# Patient Record
Sex: FEMALE | Race: BLACK OR AFRICAN AMERICAN | NOT HISPANIC OR LATINO | Employment: FULL TIME | URBAN - METROPOLITAN AREA
[De-identification: names, ages, dates, MRNs, and addresses within clinical notes are randomized per-mention and may not be internally consistent; named-entity substitution may affect disease eponyms.]

---

## 2021-06-25 ENCOUNTER — LAB (OUTPATIENT)
Dept: LAB | Facility: HOSPITAL | Age: 26
End: 2021-06-25

## 2021-06-25 ENCOUNTER — OFFICE VISIT (OUTPATIENT)
Dept: FAMILY MEDICINE CLINIC | Facility: CLINIC | Age: 26
End: 2021-06-25

## 2021-06-25 VITALS
DIASTOLIC BLOOD PRESSURE: 82 MMHG | HEART RATE: 73 BPM | HEIGHT: 62 IN | BODY MASS INDEX: 23.74 KG/M2 | WEIGHT: 129 LBS | OXYGEN SATURATION: 99 % | SYSTOLIC BLOOD PRESSURE: 118 MMHG

## 2021-06-25 DIAGNOSIS — E04.9 GOITER: ICD-10-CM

## 2021-06-25 DIAGNOSIS — R63.4 WEIGHT LOSS: ICD-10-CM

## 2021-06-25 DIAGNOSIS — Z30.09 FAMILY PLANNING: ICD-10-CM

## 2021-06-25 DIAGNOSIS — Z32.00 POSSIBLE PREGNANCY: Primary | ICD-10-CM

## 2021-06-25 LAB
B-HCG UR QL: NEGATIVE
BASOPHILS # BLD AUTO: 0.03 10*3/MM3 (ref 0–0.2)
BASOPHILS NFR BLD AUTO: 0.4 % (ref 0–1.5)
DEPRECATED RDW RBC AUTO: 40.5 FL (ref 37–54)
EOSINOPHIL # BLD AUTO: 0.05 10*3/MM3 (ref 0–0.4)
EOSINOPHIL NFR BLD AUTO: 0.7 % (ref 0.3–6.2)
ERYTHROCYTE [DISTWIDTH] IN BLOOD BY AUTOMATED COUNT: 12.1 % (ref 12.3–15.4)
HCT VFR BLD AUTO: 40.1 % (ref 34–46.6)
HCV AB SER DONR QL: NORMAL
HGB BLD-MCNC: 13.7 G/DL (ref 12–15.9)
IMM GRANULOCYTES # BLD AUTO: 0.03 10*3/MM3 (ref 0–0.05)
IMM GRANULOCYTES NFR BLD AUTO: 0.4 % (ref 0–0.5)
INTERNAL NEGATIVE CONTROL: NORMAL
INTERNAL POSITIVE CONTROL: NORMAL
LYMPHOCYTES # BLD AUTO: 2.52 10*3/MM3 (ref 0.7–3.1)
LYMPHOCYTES NFR BLD AUTO: 33.5 % (ref 19.6–45.3)
Lab: NORMAL
MCH RBC QN AUTO: 31.5 PG (ref 26.6–33)
MCHC RBC AUTO-ENTMCNC: 34.2 G/DL (ref 31.5–35.7)
MCV RBC AUTO: 92.2 FL (ref 79–97)
MONOCYTES # BLD AUTO: 0.46 10*3/MM3 (ref 0.1–0.9)
MONOCYTES NFR BLD AUTO: 6.1 % (ref 5–12)
NEUTROPHILS NFR BLD AUTO: 4.44 10*3/MM3 (ref 1.7–7)
NEUTROPHILS NFR BLD AUTO: 58.9 % (ref 42.7–76)
NRBC BLD AUTO-RTO: 0 /100 WBC (ref 0–0.2)
PLATELET # BLD AUTO: 207 10*3/MM3 (ref 140–450)
PMV BLD AUTO: 11.4 FL (ref 6–12)
RBC # BLD AUTO: 4.35 10*6/MM3 (ref 3.77–5.28)
WBC # BLD AUTO: 7.53 10*3/MM3 (ref 3.4–10.8)

## 2021-06-25 PROCEDURE — 81025 URINE PREGNANCY TEST: CPT | Performed by: INTERNAL MEDICINE

## 2021-06-25 PROCEDURE — 85025 COMPLETE CBC W/AUTO DIFF WBC: CPT

## 2021-06-25 PROCEDURE — 84443 ASSAY THYROID STIM HORMONE: CPT

## 2021-06-25 PROCEDURE — 86803 HEPATITIS C AB TEST: CPT

## 2021-06-25 PROCEDURE — 99204 OFFICE O/P NEW MOD 45 MIN: CPT | Performed by: INTERNAL MEDICINE

## 2021-06-25 PROCEDURE — 80053 COMPREHEN METABOLIC PANEL: CPT

## 2021-06-25 RX ORDER — MEDROXYPROGESTERONE ACETATE 150 MG/ML
150 INJECTION, SUSPENSION INTRAMUSCULAR
Qty: 1 EACH | Refills: 2 | Status: SHIPPED | OUTPATIENT
Start: 2021-06-25 | End: 2022-04-19 | Stop reason: SDUPTHER

## 2021-06-25 NOTE — PROGRESS NOTES
"Heather DHILLON  1995  [id number]  Patient Care Team:  Donta Shore MD as PCP - General (Internal Medicine)    Heather DHILLON is a 25 y.o. female here today to establish care.  This patient is accompanied by their self who contributes to the history of their care.    Chief Complaint:    Chief Complaint   Patient presents with   • Contraception     Is late for her period and might be pregnant   • Establish Care         History of Present Illness:    Was previously on Depo for contraception. Had used for 4 years, but stopped during pandemic. Last used depo was Nov 2020. Reports \"strange periods\". Jan after stopping depo was horrible. Since Jan they have been regular until yesterday when she did not start. Slight chance of pregnancy with unprotected intercourse.Was satisfied with Depo and would like to resume. Is due to see dr. Alfaro for annual.  Denies any abdominal pain pelvic pain nausea or vomiting.  She reports difficulty with weight loss.  Her weight is down 156 to the mid 120s.  This is despite a good appetite.  Denies any intolerance however significant cold intolerance.  No palpitations change in her hair skin or nails.    Past Medical History:   Diagnosis Date   • Depression        Past Surgical History:   Procedure Laterality Date   • WISDOM TOOTH EXTRACTION          Family History   Adopted: Yes   Problem Relation Age of Onset   • Hypertension Mother    • Mental illness Father    • Cancer Paternal Grandmother         possible breast       Social History     Socioeconomic History   • Marital status: Single     Spouse name: Not on file   • Number of children: Not on file   • Years of education: Not on file   • Highest education level: Not on file   Tobacco Use   • Smoking status: Never Smoker   • Smokeless tobacco: Never Used   Vaping Use   • Vaping Use: Never used   Substance and Sexual Activity   • Alcohol use: Yes     Comment: social   • Drug use: Yes     Types: Marijuana   • Sexual " "activity: Yes     Partners: Male     Birth control/protection: None       Allergies   Allergen Reactions   • Sulfamethoxazole-Trimethoprim Rash       Review of Systems:    Review of Systems   Constitutional: Positive for appetite change and unexpected weight loss.   Eyes: Negative.    Respiratory: Negative.    Cardiovascular: Negative for chest pain, palpitations and leg swelling.   Gastrointestinal: Negative for abdominal pain, diarrhea, nausea and vomiting.   Endocrine: Positive for cold intolerance. Negative for polydipsia and polyuria.   Musculoskeletal: Negative.    Neurological: Negative for tremors, seizures, numbness and headache.   Hematological: Negative for adenopathy. Does not bruise/bleed easily.       Vitals:    06/25/21 1417   BP: 118/82   BP Location: Left arm   Patient Position: Sitting   Cuff Size: Adult   Pulse: 73   SpO2: 99%   Weight: 58.5 kg (129 lb)   Height: 157.5 cm (62\")   PainSc:   5   PainLoc: Back     Body mass index is 23.59 kg/m².      Current Outpatient Medications:   •  medroxyPROGESTERone (Depo-Provera) 150 MG/ML injection, Inject 1 mL into the appropriate muscle as directed by prescriber Every 3 (Three) Months., Disp: 1 each, Rfl: 2    Physical Exam:    Physical Exam  Vitals and nursing note reviewed.   Constitutional:       General: She is not in acute distress.     Appearance: She is well-developed. She is not diaphoretic.   HENT:      Head: Normocephalic and atraumatic.      Right Ear: External ear normal.      Left Ear: External ear normal.      Mouth/Throat:      Mouth: Mucous membranes are dry.      Pharynx: No oropharyngeal exudate.   Eyes:      General: No scleral icterus.        Right eye: No discharge.      Extraocular Movements: Extraocular movements intact.      Conjunctiva/sclera: Conjunctivae normal.   Neck:      Thyroid: No thyromegaly.      Vascular: No JVD.      Trachea: No tracheal deviation.      Comments: Large right lobe greater than left lobe  goiter.  Right " lobe tenderness noted  Cardiovascular:      Rate and Rhythm: Normal rate and regular rhythm.      Heart sounds: Normal heart sounds.      Comments: PMI nondisplaced  Pulmonary:      Effort: Pulmonary effort is normal.      Breath sounds: Normal breath sounds. No wheezing or rales.   Abdominal:      General: Bowel sounds are normal.      Palpations: Abdomen is soft.      Tenderness: There is no abdominal tenderness. There is no guarding or rebound.   Musculoskeletal:      Cervical back: Normal range of motion and neck supple.      Comments: Normal gait   Lymphadenopathy:      Cervical: No cervical adenopathy.   Skin:     General: Skin is warm and dry.      Capillary Refill: Capillary refill takes less than 2 seconds.      Coloration: Skin is not pale.      Findings: No rash.   Neurological:      Mental Status: She is alert and oriented to person, place, and time.      Motor: No abnormal muscle tone.      Coordination: Coordination normal.   Psychiatric:         Judgment: Judgment normal.         Procedures    Results Review:    None    Assessment/Plan:     Problem List Items Addressed This Visit     None      Visit Diagnoses     Possible pregnancy    -  Primary    Urine pregnancy test negative.  I referred her back to Dr. Quesada for routine gynecologic care  The meantime I have prescribed Depo Provera 150 mg IM every     Relevant Orders    POC Pregnancy, Urine (Completed)    Weight loss        Questionable thyroid issues with tender goiter.  Thyroid profile.  Revisit in 6 weeks to assess weight stability    Relevant Orders    CBC & Differential    TSH Rfx On Abnormal To Free T4    Comprehensive Metabolic Panel    Hepatitis C Antibody    Family planning        Relevant Orders    Ambulatory Referral to Gynecology    Goiter        This is tender.  I requested TSH free T4 as well as a thyroid ultrasound.    Relevant Orders    US Thyroid          Plan of care reviewed with patient at the conclusion of today's visit.  Education was provided regarding diagnosis and management.  Patient verbalizes understanding of and agreement with management plan.    Return in about 6 weeks (around 8/6/2021), or weight.    Donta Shore MD    Please note that portions of this note may have been completed with a voice recognition program. Efforts were made to edit the dictations, but occasionally words are mistranscribed.

## 2021-06-26 LAB
ALBUMIN SERPL-MCNC: 4.9 G/DL (ref 3.5–5.2)
ALBUMIN/GLOB SERPL: 2.2 G/DL
ALP SERPL-CCNC: 47 U/L (ref 39–117)
ALT SERPL W P-5'-P-CCNC: 10 U/L (ref 1–33)
ANION GAP SERPL CALCULATED.3IONS-SCNC: 7.4 MMOL/L (ref 5–15)
AST SERPL-CCNC: 14 U/L (ref 1–32)
BILIRUB SERPL-MCNC: 1.1 MG/DL (ref 0–1.2)
BUN SERPL-MCNC: 8 MG/DL (ref 6–20)
BUN/CREAT SERPL: 11.6 (ref 7–25)
CALCIUM SPEC-SCNC: 9.2 MG/DL (ref 8.6–10.5)
CHLORIDE SERPL-SCNC: 102 MMOL/L (ref 98–107)
CO2 SERPL-SCNC: 25.6 MMOL/L (ref 22–29)
CREAT SERPL-MCNC: 0.69 MG/DL (ref 0.57–1)
GFR SERPL CREATININE-BSD FRML MDRD: 124 ML/MIN/1.73
GLOBULIN UR ELPH-MCNC: 2.2 GM/DL
GLUCOSE SERPL-MCNC: 66 MG/DL (ref 65–99)
POTASSIUM SERPL-SCNC: 3.8 MMOL/L (ref 3.5–5.2)
PROT SERPL-MCNC: 7.1 G/DL (ref 6–8.5)
SODIUM SERPL-SCNC: 135 MMOL/L (ref 136–145)
TSH SERPL DL<=0.05 MIU/L-ACNC: 0.42 UIU/ML (ref 0.27–4.2)

## 2021-06-30 ENCOUNTER — TELEPHONE (OUTPATIENT)
Dept: FAMILY MEDICINE CLINIC | Facility: CLINIC | Age: 26
End: 2021-06-30

## 2021-06-30 NOTE — TELEPHONE ENCOUNTER
Read off lab letter for patient. Explained everything was normal.   Patient verbalized understanding and did not have any additional questions.

## 2021-06-30 NOTE — TELEPHONE ENCOUNTER
Caller: Heather DHILLON    Relationship: Self    Best call back number: [phone]    What test was performed: BLOOD WORK     When was the test performed: 06/25/2021    Where was the test performed[facility]     Additional notes: PATIENT IS CURIOUS ABOUT THE RESULTS

## 2021-07-02 ENCOUNTER — HOSPITAL ENCOUNTER (OUTPATIENT)
Dept: ULTRASOUND IMAGING | Facility: HOSPITAL | Age: 26
Discharge: HOME | End: 2021-07-02
Admitting: INTERNAL MEDICINE

## 2021-07-02 PROCEDURE — 76536 US EXAM OF HEAD AND NECK: CPT

## 2021-07-06 ENCOUNTER — TELEPHONE (OUTPATIENT)
Dept: FAMILY MEDICINE CLINIC | Facility: CLINIC | Age: 26
End: 2021-07-06

## 2021-07-06 NOTE — TELEPHONE ENCOUNTER
.    Caller: Heather Tinoco    Relationship: Self    Best call back number: [phone]     Caller requesting test results: PATIENT    What test was performed: ULTRASOUND OF THYROID    When was the test performed: LAST FRIDAY    Where was the test performed: [facility]

## 2021-07-06 NOTE — TELEPHONE ENCOUNTER
Called and spoke with pt, let her know that Dr. Shore sent through a message that her thyroid was normal from the testing that had been completed. Pt verbally understood and needed nothing else at this time.

## 2021-08-09 ENCOUNTER — OFFICE VISIT (OUTPATIENT)
Dept: FAMILY MEDICINE CLINIC | Facility: CLINIC | Age: 26
End: 2021-08-09

## 2021-08-09 VITALS
WEIGHT: 131.8 LBS | SYSTOLIC BLOOD PRESSURE: 118 MMHG | BODY MASS INDEX: 24.25 KG/M2 | HEIGHT: 62 IN | DIASTOLIC BLOOD PRESSURE: 78 MMHG

## 2021-08-09 DIAGNOSIS — R35.0 URINE FREQUENCY: Primary | ICD-10-CM

## 2021-08-09 DIAGNOSIS — N75.8 BARTHOLIN'S GLAND INFECTION: ICD-10-CM

## 2021-08-09 DIAGNOSIS — N30.00 ACUTE CYSTITIS WITHOUT HEMATURIA: ICD-10-CM

## 2021-08-09 LAB
BILIRUB BLD-MCNC: NEGATIVE MG/DL
CLARITY, POC: CLEAR
COLOR UR: YELLOW
GLUCOSE UR STRIP-MCNC: NEGATIVE MG/DL
KETONES UR QL: NEGATIVE
LEUKOCYTE EST, POC: ABNORMAL
NITRITE UR-MCNC: NEGATIVE MG/ML
PH UR: 6.5 [PH] (ref 5–8)
PROT UR STRIP-MCNC: NEGATIVE MG/DL
RBC # UR STRIP: NEGATIVE /UL
SP GR UR: 1.01 (ref 1–1.03)
UROBILINOGEN UR QL: NORMAL

## 2021-08-09 PROCEDURE — 99213 OFFICE O/P EST LOW 20 MIN: CPT | Performed by: INTERNAL MEDICINE

## 2021-08-09 RX ORDER — IBUPROFEN 800 MG/1
800 TABLET ORAL EVERY 6 HOURS PRN
Qty: 60 TABLET | Refills: 2 | Status: SHIPPED | OUTPATIENT
Start: 2021-08-09 | End: 2022-04-19

## 2021-08-09 RX ORDER — AMOXICILLIN AND CLAVULANATE POTASSIUM 875; 125 MG/1; MG/1
1 TABLET, FILM COATED ORAL 2 TIMES DAILY
Qty: 28 TABLET | Refills: 0 | Status: SHIPPED | OUTPATIENT
Start: 2021-08-09 | End: 2022-04-19

## 2021-08-09 NOTE — PROGRESS NOTES
"Heather Tinoco  1995  [id number]  Patient Care Team:  Donta Shore MD as PCP - General (Internal Medicine)    Heather Tinoco is a 25 y.o. female here today for follow up.     This patient is accompanied by their self who contributes to the history of their care.    Chief Complaint:    Chief Complaint   Patient presents with   • Cyst     vaginal area, Very painful. PT states \"If she pinches it stuff comes out of it\"   • Urinary Tract Infection        History of Present Illness:  I have reviewed and/or updated the patient's past medical, past surgical, family, social history, problem list and allergies as appropriate.   Is a 25-year-old female who presents with recurrent what sounds like a Bartholin cyst.  It is very painful lower base of her labia.  She is able to express cystic white discharge from this.  No fevers or chills.  She has had this in the past and has been evaluated multiple times, however she claims that she still has been told as a \"Hairbump\".  She brings in self recorded video of her expressing white debris from this.  Additionally has been having painful urination for the past several days.  No hematuria no back pain no fevers or chills.  Has any nausea or vomiting.       Review of Systems   Constitutional: Positive for fatigue.   Respiratory: Negative.    Gastrointestinal: Negative for nausea and vomiting.   Genitourinary: Positive for dysuria, frequency, urgency and vaginal pain.   Neurological: Negative.        Vitals:    08/09/21 1455   BP: 118/78   Weight: 59.8 kg (131 lb 12.8 oz)   Height: 157.5 cm (62.01\")   PainSc:   8   PainLoc: Vagina     Body mass index is 24.1 kg/m².    Physical Exam    Procedures    Results Review:    None    Assessment/Plan:  After reviewing her documentation, suspect she has been playing or infected Bartholin gland.  I placed an urgent referral to gynecology for evaluation.  She also has evidence of cystitis..  I placed her on Augmentin for total " of 14 days given to her concomitant vaginal cyst  Problem List Items Addressed This Visit     None      Visit Diagnoses     Urine frequency    -  Primary    Relevant Medications    amoxicillin-clavulanate (Augmentin) 875-125 MG per tablet    Other Relevant Orders    POC Urinalysis Dipstick, Automated (Completed)    Urine Culture - Urine, Urine, Clean Catch    Ambulatory Referral to Gynecology    Bartholin's gland infection        Relevant Medications    amoxicillin-clavulanate (Augmentin) 875-125 MG per tablet    Other Relevant Orders    Ambulatory Referral to Gynecology    Acute cystitis without hematuria        Relevant Medications    amoxicillin-clavulanate (Augmentin) 875-125 MG per tablet    Other Relevant Orders    Ambulatory Referral to Gynecology          Plan of care reviewed with patient at the conclusion of today's visit. Education was provided regarding diagnosis and management.  Patient verbalizes understanding of and agreement with management plan.    No follow-ups on file.    Donta Shore MD    Please note that portions of this note may have been completed with a voice recognition program. Efforts were made to edit the dictations, but occasionally words are mistranscribed.

## 2022-04-19 ENCOUNTER — OFFICE VISIT (OUTPATIENT)
Dept: FAMILY MEDICINE CLINIC | Facility: CLINIC | Age: 27
End: 2022-04-19

## 2022-04-19 VITALS
OXYGEN SATURATION: 98 % | BODY MASS INDEX: 22.71 KG/M2 | RESPIRATION RATE: 18 BRPM | SYSTOLIC BLOOD PRESSURE: 112 MMHG | HEART RATE: 67 BPM | HEIGHT: 62 IN | WEIGHT: 123.4 LBS | DIASTOLIC BLOOD PRESSURE: 62 MMHG

## 2022-04-19 DIAGNOSIS — R51.9 ACUTE INTRACTABLE HEADACHE, UNSPECIFIED HEADACHE TYPE: Primary | ICD-10-CM

## 2022-04-19 DIAGNOSIS — Z30.09 FAMILY PLANNING: ICD-10-CM

## 2022-04-19 LAB
B-HCG UR QL: NEGATIVE
EXPIRATION DATE: NORMAL
INTERNAL NEGATIVE CONTROL: NEGATIVE
INTERNAL POSITIVE CONTROL: POSITIVE
Lab: NORMAL

## 2022-04-19 PROCEDURE — 99213 OFFICE O/P EST LOW 20 MIN: CPT | Performed by: PHYSICIAN ASSISTANT

## 2022-04-19 PROCEDURE — 81025 URINE PREGNANCY TEST: CPT | Performed by: PHYSICIAN ASSISTANT

## 2022-04-19 PROCEDURE — 96372 THER/PROPH/DIAG INJ SC/IM: CPT | Performed by: PHYSICIAN ASSISTANT

## 2022-04-19 RX ORDER — MEDROXYPROGESTERONE ACETATE 150 MG/ML
150 INJECTION, SUSPENSION INTRAMUSCULAR
Qty: 1 EACH | Refills: 0 | Status: SHIPPED | OUTPATIENT
Start: 2022-04-19 | End: 2022-07-11

## 2022-04-19 RX ORDER — KETOROLAC TROMETHAMINE 30 MG/ML
60 INJECTION, SOLUTION INTRAMUSCULAR; INTRAVENOUS ONCE
Status: COMPLETED | OUTPATIENT
Start: 2022-04-19 | End: 2022-04-19

## 2022-04-19 RX ADMIN — KETOROLAC TROMETHAMINE 60 MG: 30 INJECTION, SOLUTION INTRAMUSCULAR; INTRAVENOUS at 12:36
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
## 2022-04-29 ENCOUNTER — OFFICE VISIT (OUTPATIENT)
Dept: FAMILY MEDICINE CLINIC | Facility: CLINIC | Age: 27
End: 2022-04-29

## 2022-04-29 VITALS
OXYGEN SATURATION: 99 % | HEART RATE: 94 BPM | HEIGHT: 62 IN | SYSTOLIC BLOOD PRESSURE: 122 MMHG | BODY MASS INDEX: 22.63 KG/M2 | WEIGHT: 123 LBS | DIASTOLIC BLOOD PRESSURE: 64 MMHG

## 2022-04-29 DIAGNOSIS — Z00.00 ANNUAL PHYSICAL EXAM: Primary | ICD-10-CM

## 2022-04-29 DIAGNOSIS — N89.8 VAGINAL DISCHARGE: ICD-10-CM

## 2022-04-29 DIAGNOSIS — F51.04 PSYCHOPHYSIOLOGICAL INSOMNIA: ICD-10-CM

## 2022-04-29 PROCEDURE — 99395 PREV VISIT EST AGE 18-39: CPT | Performed by: INTERNAL MEDICINE

## 2022-04-29 PROCEDURE — 3008F BODY MASS INDEX DOCD: CPT | Performed by: INTERNAL MEDICINE

## 2022-04-29 PROCEDURE — 2014F MENTAL STATUS ASSESS: CPT | Performed by: INTERNAL MEDICINE

## 2022-04-29 RX ORDER — FLUCONAZOLE 200 MG/1
200 TABLET ORAL DAILY
Qty: 5 TABLET | Refills: 0 | Status: SHIPPED | OUTPATIENT
Start: 2022-04-29 | End: 2023-02-02

## 2022-04-29 RX ORDER — TRAZODONE HYDROCHLORIDE 100 MG/1
100 TABLET ORAL NIGHTLY
Qty: 30 TABLET | Refills: 9 | Status: SHIPPED | OUTPATIENT
Start: 2022-04-29 | End: 2022-05-25 | Stop reason: SDUPTHER
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
## 2022-04-29 NOTE — ASSESSMENT & PLAN NOTE
With recent antibiotic history suggests candidal overgrowth.  Recommend fluconazole milligrams for 5 days.  Discharge persists she should schedule with her OB.  She is due for a Pap pelvic regardless and was reminded of the

## 2022-05-25 RX ORDER — TRAZODONE HYDROCHLORIDE 100 MG/1
100 TABLET ORAL NIGHTLY
Qty: 90 TABLET | Refills: 1 | Status: SHIPPED | OUTPATIENT
Start: 2022-05-25 | End: 2022-11-07

## 2022-07-11 RX ORDER — MEDROXYPROGESTERONE ACETATE 150 MG/ML
150 INJECTION, SUSPENSION INTRAMUSCULAR
Qty: 1 ML | Refills: 0 | Status: SHIPPED | OUTPATIENT
Start: 2022-07-11 | End: 2022-10-03

## 2022-07-27 RX ORDER — SUMATRIPTAN 100 MG/1
100 TABLET, FILM COATED ORAL
Qty: 9 TABLET | Refills: 0 | Status: SHIPPED | OUTPATIENT
Start: 2022-07-27

## 2022-10-03 RX ORDER — MEDROXYPROGESTERONE ACETATE 150 MG/ML
150 INJECTION, SUSPENSION INTRAMUSCULAR
Qty: 1 ML | Refills: 0 | Status: SHIPPED | OUTPATIENT
Start: 2022-10-03 | End: 2023-01-08 | Stop reason: SDUPTHER

## 2022-11-06 ENCOUNTER — PATIENT MESSAGE (OUTPATIENT)
Dept: FAMILY MEDICINE CLINIC | Facility: CLINIC | Age: 27
End: 2022-11-06

## 2022-11-07 RX ORDER — AMITRIPTYLINE HYDROCHLORIDE 25 MG/1
25 TABLET, FILM COATED ORAL NIGHTLY
Qty: 30 TABLET | Refills: 9 | Status: SHIPPED | OUTPATIENT
Start: 2022-11-07

## 2023-01-08 ENCOUNTER — PATIENT MESSAGE (OUTPATIENT)
Dept: FAMILY MEDICINE CLINIC | Facility: CLINIC | Age: 28
End: 2023-01-08
Payer: COMMERCIAL

## 2023-01-09 RX ORDER — MEDROXYPROGESTERONE ACETATE 150 MG/ML
150 INJECTION, SUSPENSION INTRAMUSCULAR
Qty: 1 ML | Refills: 0 | Status: SHIPPED | OUTPATIENT
Start: 2023-01-09

## 2023-01-09 NOTE — TELEPHONE ENCOUNTER
Rx Refill Note  Requested Prescriptions     Pending Prescriptions Disp Refills   • medroxyPROGESTERone (DEPO-PROVERA) 150 MG/ML injection 1 mL 0     Sig: Inject 1 mL into the appropriate muscle as directed by prescriber Every 3 (Three) Months.      Last office visit with prescribing clinician: 4/29/2022   Last telemedicine visit with prescribing clinician: Visit date not found   Next office visit with prescribing clinician: Visit date not found                         Would you like a call back once the refill request has been completed: [] Yes [] No    If the office needs to give you a call back, can they leave a voicemail: [] Yes [] No    Laura Salomon MA  01/09/23, 09:03 EST

## 2023-02-02 ENCOUNTER — OFFICE VISIT (OUTPATIENT)
Dept: FAMILY MEDICINE CLINIC | Facility: CLINIC | Age: 28
End: 2023-02-02
Payer: COMMERCIAL

## 2023-02-02 VITALS
BODY MASS INDEX: 25.21 KG/M2 | OXYGEN SATURATION: 96 % | WEIGHT: 137 LBS | TEMPERATURE: 97.1 F | SYSTOLIC BLOOD PRESSURE: 116 MMHG | HEART RATE: 89 BPM | DIASTOLIC BLOOD PRESSURE: 74 MMHG | HEIGHT: 62 IN

## 2023-02-02 DIAGNOSIS — N89.8 VAGINAL DISCHARGE: Primary | ICD-10-CM

## 2023-02-02 PROCEDURE — 87591 N.GONORRHOEAE DNA AMP PROB: CPT | Performed by: PHYSICIAN ASSISTANT

## 2023-02-02 PROCEDURE — 87491 CHLMYD TRACH DNA AMP PROBE: CPT | Performed by: PHYSICIAN ASSISTANT

## 2023-02-02 PROCEDURE — 87798 DETECT AGENT NOS DNA AMP: CPT | Performed by: PHYSICIAN ASSISTANT

## 2023-02-02 PROCEDURE — 87661 TRICHOMONAS VAGINALIS AMPLIF: CPT | Performed by: PHYSICIAN ASSISTANT

## 2023-02-02 PROCEDURE — 87801 DETECT AGNT MULT DNA AMPLI: CPT | Performed by: PHYSICIAN ASSISTANT

## 2023-02-02 PROCEDURE — 99213 OFFICE O/P EST LOW 20 MIN: CPT | Performed by: PHYSICIAN ASSISTANT

## 2023-02-02 RX ORDER — METRONIDAZOLE 500 MG/1
500 TABLET ORAL 2 TIMES DAILY
Qty: 14 TABLET | Refills: 0 | Status: SHIPPED | OUTPATIENT
Start: 2023-02-02 | End: 2023-02-09

## 2023-02-02 NOTE — PROGRESS NOTES
"    Chief Complaint   Patient presents with   • Vaginal Itching     With white discharge. She thinks that she may have BV.        HPI     Heather Tinoco is a pleasant 27 y.o. female who presents for evaluation of \"chief complaint.\"     Patient c/o milky white vaginal discharge x 1-2 weeks. Changed soaps prior to symptom onset. No itching, new sexual partners, recent antibiotics. Feels similar to bacterial vaginosis she had in the past. Last episode 2 months ago did improve with flagyl. She usually sees her gynecologist in April.      Past Medical History:   Diagnosis Date   • Depression    • Eating disorder 04/27/2022       Past Surgical History:   Procedure Laterality Date   • WISDOM TOOTH EXTRACTION         Family History   Adopted: Yes   Problem Relation Age of Onset   • Hypertension Mother    • Mental illness Mother    • Mental illness Father    • Cancer Paternal Grandmother        Social History     Socioeconomic History   • Marital status: Single   Tobacco Use   • Smoking status: Never   • Smokeless tobacco: Never   Vaping Use   • Vaping Use: Never used   Substance and Sexual Activity   • Alcohol use: Yes     Alcohol/week: 1.0 standard drink     Types: 1 Glasses of wine per week     Comment: social   • Drug use: Yes     Types: Marijuana   • Sexual activity: Yes     Partners: Male     Birth control/protection: Injection       Allergies   Allergen Reactions   • Adhesive Tape Rash   • Sulfamethoxazole-Trimethoprim Rash       ROS    Review of Systems   Gastrointestinal: Negative for abdominal pain.   Genitourinary: Positive for vaginal discharge.       Vitals:    02/02/23 1120   BP: 116/74   Pulse: 89   Temp: 97.1 °F (36.2 °C)   SpO2: 96%     Body mass index is 25.05 kg/m².      Current Outpatient Medications:   •  amitriptyline (ELAVIL) 25 MG tablet, Take 1 tablet by mouth Every Night., Disp: 30 tablet, Rfl: 9  •  medroxyPROGESTERone (DEPO-PROVERA) 150 MG/ML injection, Inject 1 mL into the appropriate muscle " as directed by prescriber Every 3 (Three) Months., Disp: 1 mL, Rfl: 0  •  SUMAtriptan (Imitrex) 100 MG tablet, Take 1 tablet by mouth Every 2 (Two) Hours As Needed for Migraine. Take one tablet at onset of headache. May repeat dose one time in 2 hours if headache not relieved., Disp: 9 tablet, Rfl: 0  •  metroNIDAZOLE (Flagyl) 500 MG tablet, Take 1 tablet by mouth 2 (Two) Times a Day for 7 days., Disp: 14 tablet, Rfl: 0    PE    Physical Exam  Vitals reviewed.   Constitutional:       General: She is not in acute distress.  Pulmonary:      Effort: Pulmonary effort is normal. No respiratory distress.   Neurological:      Mental Status: She is alert.   Psychiatric:         Mood and Affect: Mood normal.          A/P    Problem List Items Addressed This Visit        Genitourinary and Reproductive     Vaginal discharge - Primary  -Check Nuswab and start empiric flagyl for suspected BV.   -Counseled pt to notify the office or f/u with her GYN if symptoms worsen or do not improve.     Relevant Orders    NuSwab VG+ - Swab, Vagina       Plan of care was reviewed with patient at the conclusion of today's visit. Education was provided regarding diagnoses, management, prescribed or recommended OTC products, and the importance of compliance with follow-up appointments. The patient was counseled regarding the risks, benefits, and possible side-effects of treatment. I advised the patient to keep me informed of any acute changes in their status including new, worsening, or persistent symptoms. Patient expresses understanding and agreement with the management plan.        EMILIE Box

## 2023-02-06 LAB
A VAGINAE DNA VAG QL NAA+PROBE: ABNORMAL SCORE
BVAB2 DNA VAG QL NAA+PROBE: ABNORMAL SCORE
C ALBICANS DNA VAG QL NAA+PROBE: NEGATIVE
C GLABRATA DNA VAG QL NAA+PROBE: NEGATIVE
C TRACH DNA VAG QL NAA+PROBE: NEGATIVE
MEGA1 DNA VAG QL NAA+PROBE: ABNORMAL SCORE
N GONORRHOEA DNA VAG QL NAA+PROBE: NEGATIVE
T VAGINALIS DNA VAG QL NAA+PROBE: NEGATIVE

## 2023-04-10 RX ORDER — MEDROXYPROGESTERONE ACETATE 150 MG/ML
150 INJECTION, SUSPENSION INTRAMUSCULAR
Qty: 1 ML | Refills: 0 | Status: SHIPPED | OUTPATIENT
Start: 2023-04-10

## 2023-04-10 NOTE — TELEPHONE ENCOUNTER
Rx Refill Note  Requested Prescriptions     Pending Prescriptions Disp Refills   • medroxyPROGESTERone (DEPO-PROVERA) 150 MG/ML injection [Pharmacy Med Name: MEDROXYPROGESTERONE 150 MG/ML] 1 mL 0     Sig: INJECT 1 ML INTO THE APPROPRIATE MUSCLE AS DIRECTED BY PRESCRIBER EVERY 3 (THREE) MONTHS.      Last office visit with prescribing clinician: 4/29/2022   Last telemedicine visit with prescribing clinician: 5/1/2023   Next office visit with prescribing clinician: 5/1/2023                         Would you like a call back once the refill request has been completed: [] Yes [] No    If the office needs to give you a call back, can they leave a voicemail: [] Yes [] No    Maricruz Boo MA  04/10/23, 14:01 EDT

## 2023-08-31 ENCOUNTER — OFFICE VISIT (OUTPATIENT)
Dept: FAMILY MEDICINE CLINIC | Facility: CLINIC | Age: 28
End: 2023-08-31
Payer: COMMERCIAL

## 2023-08-31 VITALS
SYSTOLIC BLOOD PRESSURE: 122 MMHG | WEIGHT: 129.4 LBS | HEART RATE: 85 BPM | BODY MASS INDEX: 23.81 KG/M2 | DIASTOLIC BLOOD PRESSURE: 78 MMHG | OXYGEN SATURATION: 100 % | HEIGHT: 62 IN

## 2023-08-31 DIAGNOSIS — R30.0 DYSURIA: ICD-10-CM

## 2023-08-31 DIAGNOSIS — N89.8 VAGINAL DISCHARGE: Primary | ICD-10-CM

## 2023-08-31 DIAGNOSIS — Z00.00 ENCOUNTER FOR ROUTINE HISTORY AND PHYSICAL EXAM IN FEMALE: ICD-10-CM

## 2023-08-31 DIAGNOSIS — Z00.00 ANNUAL PHYSICAL EXAM: ICD-10-CM

## 2023-08-31 PROBLEM — N76.1 SUBACUTE VAGINITIS: Status: ACTIVE | Noted: 2023-08-31

## 2023-08-31 LAB
BILIRUB BLD-MCNC: NEGATIVE MG/DL
CLARITY, POC: CLEAR
COLOR UR: YELLOW
EXPIRATION DATE: ABNORMAL
GLUCOSE UR STRIP-MCNC: NEGATIVE MG/DL
KETONES UR QL: NEGATIVE
LEUKOCYTE EST, POC: ABNORMAL
Lab: ABNORMAL
NITRITE UR-MCNC: NEGATIVE MG/ML
PH UR: 5.5 [PH] (ref 5–8)
PROT UR STRIP-MCNC: ABNORMAL MG/DL
RBC # UR STRIP: ABNORMAL /UL
SP GR UR: 1.03 (ref 1–1.03)
UROBILINOGEN UR QL: NORMAL

## 2023-08-31 PROCEDURE — 87086 URINE CULTURE/COLONY COUNT: CPT | Performed by: INTERNAL MEDICINE

## 2023-08-31 PROCEDURE — 87491 CHLMYD TRACH DNA AMP PROBE: CPT | Performed by: INTERNAL MEDICINE

## 2023-08-31 PROCEDURE — 87591 N.GONORRHOEAE DNA AMP PROB: CPT | Performed by: INTERNAL MEDICINE

## 2023-08-31 PROCEDURE — 87661 TRICHOMONAS VAGINALIS AMPLIF: CPT | Performed by: INTERNAL MEDICINE

## 2023-08-31 RX ORDER — DOXYCYCLINE HYCLATE 100 MG/1
100 CAPSULE ORAL 2 TIMES DAILY
Qty: 6 CAPSULE | Refills: 0 | Status: SHIPPED | OUTPATIENT
Start: 2023-08-31

## 2023-08-31 RX ORDER — METRONIDAZOLE 500 MG/1
500 TABLET ORAL 3 TIMES DAILY
Qty: 21 TABLET | Refills: 0 | Status: SHIPPED | OUTPATIENT
Start: 2023-08-31

## 2023-08-31 RX ORDER — MEDROXYPROGESTERONE ACETATE 150 MG/ML
150 INJECTION, SUSPENSION INTRAMUSCULAR
Qty: 1 ML | Refills: 2 | Status: SHIPPED | OUTPATIENT
Start: 2023-10-15

## 2023-08-31 NOTE — PROGRESS NOTES
Heather Tinoco  1995  [id number]  Patient Care Team:  Donta Shore MD as PCP - General (Internal Medicine)    Heather Tinoco is a 27 y.o. female here today for annual exam.  This patient is accompanied by their self who contributes to the history of their care.    Chief Complaint:    Chief Complaint   Patient presents with    Annual Exam    Vaginitis       History of Present Illness:   Here for annual exam. Sleeping better. Reports seeing ob/gyn one year. Will be scheduling annual. Odiferous discharge x 2 mon. Did no complete her last flagyl course. Denies pelvic pain, nausea or vomiting. There is dysuria at end of stream. Denies fevers or chills. Remians of Depo-provera. There has been no hematuria.    Has had no recent migraines which is welcome to her.  Vaccinations are fairly up-to-date she would be due for COVID this year.  Past Medical History:   Diagnosis Date    Depression     Eating disorder 04/27/2022       Past Surgical History:   Procedure Laterality Date    WISDOM TOOTH EXTRACTION          Family History   Adopted: Yes   Problem Relation Age of Onset    Hypertension Mother     Mental illness Mother     Mental illness Father     Cancer Paternal Grandmother        Social History     Socioeconomic History    Marital status: Single   Tobacco Use    Smoking status: Never    Smokeless tobacco: Never   Vaping Use    Vaping Use: Never used   Substance and Sexual Activity    Alcohol use: Yes     Alcohol/week: 1.0 standard drink     Types: 1 Glasses of wine per week     Comment: social    Drug use: Yes     Types: Marijuana    Sexual activity: Yes     Partners: Male     Birth control/protection: Injection       Allergies   Allergen Reactions    Adhesive Tape Rash    Sulfamethoxazole-Trimethoprim Rash       Depression: PHQ-2 Depression Screening  Little interest or pleasure in doing things? 0-->not at all   Feeling down, depressed, or hopeless? 0-->not at all   PHQ-2 Total Score 0       Immunization History   Administered Date(s) Administered    COVID-19 (RICK) 01/27/2023    COVID-19 (PFIZER) Purple Cap Monovalent 06/05/2022    Fluzone >6mos 11/17/2015    HPV Quadrivalent 03/22/2010, 03/29/2012    Influenza Quad Vaccine (Inpatient) 11/16/2016    MCV4 Unspecified 06/16/2009    Meningococcal Conjugate 06/16/2009    Tdap 09/28/2016, 09/08/2019       Intimate partner violence ( Screen on initial visit, pregnant women, women with injuries, older adult with injury or evidence of neglect):  -Violence can be a problem in many people's lives, so I now ask every patient about trauma or abuse they may have experienced in a relationship.   Stress/Safety - Do you feel safe in your relationship? na   Afraid/Abused - Have you ever been in a relationship where you were threatened, hurt, or afraid? n   Friend/Family - Are your friends aware you have been hurt? Na/   Emergency Plan - Do you have a safe place to go and the resources you need in an emergency? na    Review of Systems:    Review of Systems   Constitutional:  Negative for chills, fatigue, fever, unexpected weight gain and unexpected weight loss.   HENT:  Negative for ear pain, postnasal drip, sinus pressure and sore throat.    Eyes:  Negative for blurred vision, double vision and visual disturbance.   Respiratory:  Negative for cough, shortness of breath and wheezing.    Cardiovascular:  Negative for chest pain, palpitations and leg swelling.   Gastrointestinal:  Negative for abdominal pain, blood in stool, diarrhea, nausea and vomiting.   Endocrine: Negative for cold intolerance, heat intolerance, polydipsia, polyphagia and polyuria.   Genitourinary:  Positive for dysuria and vaginal discharge. Negative for flank pain and hematuria.        Vaginal odor   Musculoskeletal:  Negative for arthralgias and joint swelling.   Skin:  Negative for dry skin and rash.   Neurological:  Negative for weakness, numbness and headache.   Psychiatric/Behavioral:   "Negative for self-injury, suicidal ideas and depressed mood.      Vitals:    08/31/23 1542   BP: 122/78   Pulse: 85   SpO2: 100%   Weight: 58.7 kg (129 lb 6.4 oz)   Height: 157.5 cm (62.01\")     Body mass index is 23.66 kg/mý.      Current Outpatient Medications:     [START ON 10/15/2023] medroxyPROGESTERone (DEPO-PROVERA) 150 MG/ML injection, Inject 1 mL into the appropriate muscle as directed by prescriber Every 3 (Three) Months., Disp: 1 mL, Rfl: 2    doxycycline (VIBRAMYCIN) 100 MG capsule, Take 1 capsule by mouth 2 (Two) Times a Day., Disp: 6 capsule, Rfl: 0    metroNIDAZOLE (Flagyl) 500 MG tablet, Take 1 tablet by mouth 3 (Three) Times a Day., Disp: 21 tablet, Rfl: 0    Physical Exam:    Physical Exam  Vitals and nursing note reviewed.   Constitutional:       General: She is not in acute distress.     Appearance: She is well-developed. She is not diaphoretic.   HENT:      Head: Normocephalic and atraumatic.      Right Ear: External ear normal.      Left Ear: External ear normal.      Mouth/Throat:      Pharynx: No oropharyngeal exudate.   Eyes:      General: No scleral icterus.        Right eye: No discharge.      Conjunctiva/sclera: Conjunctivae normal.   Neck:      Thyroid: No thyromegaly.      Vascular: No JVD.      Trachea: No tracheal deviation.   Cardiovascular:      Rate and Rhythm: Normal rate and regular rhythm.      Heart sounds: Normal heart sounds.      Comments: PMI nondisplaced  Pulmonary:      Effort: Pulmonary effort is normal.      Breath sounds: Normal breath sounds. No wheezing or rales.   Abdominal:      General: Bowel sounds are normal.      Palpations: Abdomen is soft.      Tenderness: There is no abdominal tenderness. There is no guarding or rebound.   Musculoskeletal:      Cervical back: Normal range of motion and neck supple.   Lymphadenopathy:      Cervical: No cervical adenopathy.   Skin:     General: Skin is warm and dry.      Capillary Refill: Capillary refill takes less than 2 " seconds.      Coloration: Skin is not pale.      Findings: No rash.   Neurological:      Mental Status: She is alert and oriented to person, place, and time.      Motor: No abnormal muscle tone.      Coordination: Coordination normal.   Psychiatric:         Judgment: Judgment normal.       Procedures    Results Review:    None  ponent  Ref Range & Units 16:30  (8/31/23)   Color  Yellow, Straw, Dark Yellow, Andreea Yellow   Clarity, UA  Clear Clear   Specific Gravity  1.005 - 1.030 1.030   pH, Urine  5.0 - 8.0 5.5   Leukocytes  Negative Trace Abnormal    Nitrite, UA  Negative Negative   Protein, POC  Negative mg/dL Trace Abnormal    Glucose, UA  Negative mg/dL Negative   Ketones, UA  Negative Negative   Urobilinogen, UA  Normal, 0.2 E.U./dL Normal   Bilirubin  Negative Negative   Blood, UA  Negative Trace Abnormal      Assessment/Plan:    Problem List Items Addressed This Visit          Genitourinary and Reproductive     Vaginal discharge - Primary    Relevant Orders    Chlamydia trachomatis, Neisseria gonorrhoeae, Trichomonas vaginalis, PCR - Urine, Urine, Clean Catch    POC Urinalysis Dipstick, Automated (Completed)    Urine Culture - Urine, Urine, Clean Catch    NuSwab BV & Candida - Urine, Urine, Clean Catch       Health Encounters    Annual physical exam    Relevant Orders    Comprehensive Metabolic Panel    Lipid Panel    TSH Rfx On Abnormal To Free T4    CBC (No Diff)     Other Visit Diagnoses       Encounter for routine history and physical exam in female        Relevant Orders    Ambulatory Referral to Gynecology    Dysuria        Relevant Orders    POC Urinalysis Dipstick, Automated (Completed)    Urine Culture - Urine, Urine, Clean Catch    NuSwab BV & Candida - Urine, Urine, Clean Catch            Refilled her Depo-Provera.  Empirically placed her on Flagyl awaiting BV testing.  Compelled enough by her urinalysis to place her on doxycycline x3 days for urinary tract infection.  Await culture.    Plan of  care was reviewed with patient at the conclusion of today's visit. Counseled patient with regards to good nutrition and diet. Maintaining a healthy lifestyle including exercise and physical activities. Spoke with patient on ways to reduce stress, getting adequate sleep and injury prevention.  Discussed mammogram, colon cancer screening, osteoporosis and pap smear including benefit of early detection and potential need for follow-up. Patient agrees to screening mammogram, colonoscopy, bone density and gyn referral today. Annual dental and eye exams were encouraged. Encouraged patient to continue to follow up with annual immunizations.     Return in about 1 year (around 8/31/2024) for Annual.    Donta Shore MD    Please note than portions of this note were completed wth a Voice Recognition Program

## 2023-09-01 LAB — BACTERIA SPEC AEROBE CULT: NO GROWTH

## 2023-09-06 LAB
C TRACH RRNA SPEC QL NAA+PROBE: NEGATIVE
N GONORRHOEA RRNA SPEC QL NAA+PROBE: NEGATIVE
T VAGINALIS RRNA SPEC QL NAA+PROBE: NEGATIVE

## 2023-10-10 RX ORDER — MEDROXYPROGESTERONE ACETATE 150 MG/ML
INJECTION, SUSPENSION INTRAMUSCULAR
Qty: 1 EACH | Refills: 0 | Status: SHIPPED | OUTPATIENT
Start: 2023-10-10

## 2023-10-10 NOTE — TELEPHONE ENCOUNTER
Rx Refill Note  Requested Prescriptions     Pending Prescriptions Disp Refills    medroxyPROGESTERone Acetate 150 MG/ML suspension prefilled syringe [Pharmacy Med Name: MEDROXYPROGESTERONE 150 MG/ML] 1 each 0     Sig: INJECT 1 ML INTO THE APPROPRIATE MUSCLE AS DIRECTED BY PRESCRIBER EVERY 3 (THREE) MONTHS.      Last office visit with prescribing clinician: 8/31/2023   Last telemedicine visit with prescribing clinician: Visit date not found   Next office visit with prescribing clinician: Visit date not found                         Would you like a call back once the refill request has been completed: [] Yes [] No    If the office needs to give you a call back, can they leave a voicemail: [] Yes [] No    Maricruz Boo MA  10/10/23, 10:06 EDT